# Patient Record
Sex: FEMALE | Race: WHITE | NOT HISPANIC OR LATINO | URBAN - METROPOLITAN AREA
[De-identification: names, ages, dates, MRNs, and addresses within clinical notes are randomized per-mention and may not be internally consistent; named-entity substitution may affect disease eponyms.]

---

## 2017-01-15 ENCOUNTER — EMERGENCY (EMERGENCY)
Facility: HOSPITAL | Age: 67
LOS: 1 days | Discharge: HOME | End: 2017-01-15
Attending: EMERGENCY MEDICINE | Admitting: EMERGENCY MEDICINE
Payer: SELF-PAY

## 2017-01-15 VITALS
TEMPERATURE: 98 F | SYSTOLIC BLOOD PRESSURE: 128 MMHG | RESPIRATION RATE: 16 BRPM | DIASTOLIC BLOOD PRESSURE: 83 MMHG | HEART RATE: 80 BPM | OXYGEN SATURATION: 98 %

## 2017-01-15 DIAGNOSIS — M54.9 DORSALGIA, UNSPECIFIED: ICD-10-CM

## 2017-01-15 DIAGNOSIS — S23.3XXA SPRAIN OF LIGAMENTS OF THORACIC SPINE, INITIAL ENCOUNTER: ICD-10-CM

## 2017-01-15 PROCEDURE — 72070 X-RAY EXAM THORAC SPINE 2VWS: CPT | Mod: 26

## 2017-01-15 PROCEDURE — 72100 X-RAY EXAM L-S SPINE 2/3 VWS: CPT | Mod: 26

## 2017-01-15 PROCEDURE — 99283 EMERGENCY DEPT VISIT LOW MDM: CPT

## 2017-01-15 RX ORDER — IBUPROFEN 200 MG
1 TABLET ORAL
Qty: 28 | Refills: 0 | OUTPATIENT
Start: 2017-01-15 | End: 2017-01-22

## 2017-01-15 RX ORDER — DIAZEPAM 5 MG
1 TABLET ORAL
Qty: 12 | Refills: 0 | OUTPATIENT
Start: 2017-01-15 | End: 2017-01-18

## 2017-01-15 RX ORDER — DIAZEPAM 5 MG
5 TABLET ORAL ONCE
Qty: 0 | Refills: 0 | Status: DISCONTINUED | OUTPATIENT
Start: 2017-01-15 | End: 2017-01-15

## 2017-01-15 RX ORDER — IBUPROFEN 200 MG
600 TABLET ORAL ONCE
Qty: 0 | Refills: 0 | Status: COMPLETED | OUTPATIENT
Start: 2017-01-15 | End: 2017-01-15

## 2017-01-15 RX ADMIN — Medication 600 MILLIGRAM(S): at 14:58

## 2017-01-15 RX ADMIN — Medication 5 MILLIGRAM(S): at 14:58

## 2017-01-15 NOTE — ED PROVIDER NOTE - OBJECTIVE STATEMENT
65 yo F with no PMHx presents c/o back pain. 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 Pt notes that over the past 72 hours pain has been worsening to upper back and radiates down to lower back and hips. Sxs have been improved with heating pads and Aleve. Denies any head trauma, LOC, cough, fever, chills, rib pain, CP, SOB or HA.

## 2017-01-15 NOTE — ED ADULT NURSE NOTE - OBJECTIVE STATEMENT
Pt reported she fell during exercise class 3 d ago, increased lower back pain, no deformity or swelling, limited ROM. no urination difficulty. denied other medical condition.

## 2017-01-15 NOTE — ED PROVIDER NOTE - MEDICAL DECISION MAKING DETAILS
Pt with mechanical fall now presenting with musculoskeletal upper back pain. Vital signs are stable and exam unremarkable. Will conduct X-ray of lumbar spine and give Motrin and muscle relaxer and reassess. 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

## 2017-01-15 NOTE — ED PROVIDER NOTE - MUSCULOSKELETAL, MLM
FROM of hips with no deformity, ecchymosis or point tenderness. No c-spine tenderness, but has right scapula region point tenderness with no deformity, crepitus and skin intact

## 2017-01-15 NOTE — ED PROVIDER NOTE - NS ED MD SCRIBE ATTENDING SCRIBE SECTIONS
RESULTS/HIV/CONSULTATIONS/SHIFT CHANGE/HISTORY OF PRESENT ILLNESS/REVIEW OF SYSTEMS/PROGRESS NOTE/PHYSICAL EXAM/DISPOSITION/PAST MEDICAL/SURGICAL/SOCIAL HISTORY/VITAL SIGNS( Pullset)/INTAKE ASSESSMENT/SCREENINGS

## 2018-03-21 NOTE — ED ADULT NURSE NOTE - CAS DISCH TRANSFER METHOD
Two RNs attempted to obtain peripheral IV and labs. Both unsuccessful. Page to IV team       Yen Etienne, RN  03/21/18 [id number]     Private car

## 2022-09-14 NOTE — ED ADULT NURSE NOTE - ATTEMPT TO OOB
CINTHYA at [phone] informing pt to call back to r/s the 9/12 cancelled [facility] date.  Please r/s the appt to 9/27/22
no

## 2023-03-07 NOTE — ED PROVIDER NOTE - NS ED ATTENDING STATEMENT MOD
Mohsen Omer
I have reviewed and agree with all pertinent clinical information, including history and physical exam and agree with treatment plan of the PA.